# Patient Record
Sex: FEMALE | Race: BLACK OR AFRICAN AMERICAN | URBAN - METROPOLITAN AREA
[De-identification: names, ages, dates, MRNs, and addresses within clinical notes are randomized per-mention and may not be internally consistent; named-entity substitution may affect disease eponyms.]

---

## 2024-02-27 ENCOUNTER — APPOINTMENT (RX ONLY)
Dept: URBAN - METROPOLITAN AREA CLINIC 33 | Facility: CLINIC | Age: 44
Setting detail: DERMATOLOGY
End: 2024-02-27

## 2024-02-27 DIAGNOSIS — L21.8 OTHER SEBORRHEIC DERMATITIS: ICD-10-CM

## 2024-02-27 DIAGNOSIS — L29.89 OTHER PRURITUS: ICD-10-CM

## 2024-02-27 DIAGNOSIS — L29.8 OTHER PRURITUS: ICD-10-CM

## 2024-02-27 PROCEDURE — ? PRESCRIPTION MEDICATION MANAGEMENT

## 2024-02-27 PROCEDURE — ? PRESCRIPTION

## 2024-02-27 PROCEDURE — ? COUNSELING

## 2024-02-27 PROCEDURE — 99203 OFFICE O/P NEW LOW 30 MIN: CPT

## 2024-02-27 RX ORDER — HYDROCORTISONE 25 MG/G
OINTMENT TOPICAL
Qty: 28.35 | Refills: 1 | Status: ERX | COMMUNITY
Start: 2024-02-27

## 2024-02-27 RX ORDER — CLOBETASOL PROPIONATE 0.5 MG/G
OINTMENT TOPICAL
Qty: 60 | Refills: 2 | Status: ERX | COMMUNITY
Start: 2024-02-27

## 2024-02-27 RX ORDER — KETOCONAZOLE 20 MG/ML
SHAMPOO, SUSPENSION TOPICAL AS DIRECTED
Qty: 120 | Refills: 5 | Status: ERX | COMMUNITY
Start: 2024-02-27

## 2024-02-27 RX ADMIN — CLOBETASOL PROPIONATE: 0.5 OINTMENT TOPICAL at 00:00

## 2024-02-27 RX ADMIN — HYDROCORTISONE: 25 OINTMENT TOPICAL at 00:00

## 2024-02-27 RX ADMIN — KETOCONAZOLE: 20 SHAMPOO, SUSPENSION TOPICAL at 00:00

## 2024-02-27 ASSESSMENT — LOCATION SIMPLE DESCRIPTION DERM
LOCATION SIMPLE: LEFT SCALP
LOCATION SIMPLE: VULVA

## 2024-02-27 ASSESSMENT — LOCATION ZONE DERM
LOCATION ZONE: VULVA
LOCATION ZONE: SCALP

## 2024-02-27 ASSESSMENT — LOCATION DETAILED DESCRIPTION DERM
LOCATION DETAILED: RIGHT LABIA MINORA
LOCATION DETAILED: LEFT MEDIAL FRONTAL SCALP

## 2024-02-27 NOTE — HPI: RASH
What Type Of Note Output Would You Prefer (Optional)?: Bullet Format
How Severe Is Your Rash?: mild
Is This A New Presentation, Or A Follow-Up?: Rash
Additional History: Pt is here concerning itchy rash on scalp that has been going for years and rash in groin area that has been going on for 8 months. Pt reports sores in scalp. Scalp itch is 10/10. Groin is itchy 8/10. Pt has tried OTC hair oils which has not helped rash in scalp. Pt has also tried Vagasil for rash in groin which was not helpful.

## 2024-02-27 NOTE — PROCEDURE: PRESCRIPTION MEDICATION MANAGEMENT
Initiate Treatment: clobetasol 0.05 % topical ointment 1-2 x daily\\n: Apply thin layer to affected areas on scalp 1-2 x daily as needed.\\n\\nketoconazole 2 % shampoo As directed\\nApply a small amount and lather to damp scalp ,let sit for 3 to 5 minutes then rinse. Can repeat 2-3 x weekly.
Plan: Unable to examine scalp due to pt hair being styled in jason [employer]s.\\n\\nRTC 3 mo
Render In Strict Bullet Format?: No
Detail Level: Zone
Initiate Treatment: hydrocortisone 2.5 % topical ointment \\nApply a thin layer to affected area in the groin twice daily 2 weeks/month, stop for 1 week and resume as needed
Plan: Possible sub-clinical inflammation at site. We will treat with topical steroid.\\n\\nAdvised pt to use Dove sensitive skin bar soap.\\n\\nRTC 3 mo to f/u

## 2024-05-28 ENCOUNTER — APPOINTMENT (RX ONLY)
Dept: URBAN - METROPOLITAN AREA CLINIC 33 | Facility: CLINIC | Age: 44
Setting detail: DERMATOLOGY
End: 2024-05-28

## 2024-05-28 ENCOUNTER — RX ONLY (OUTPATIENT)
Age: 44
Setting detail: RX ONLY
End: 2024-05-28

## 2024-05-28 DIAGNOSIS — L29.8 OTHER PRURITUS: ICD-10-CM

## 2024-05-28 DIAGNOSIS — L29.89 OTHER PRURITUS: ICD-10-CM

## 2024-05-28 DIAGNOSIS — L21.8 OTHER SEBORRHEIC DERMATITIS: ICD-10-CM

## 2024-05-28 PROCEDURE — ? SEPARATE AND IDENTIFIABLE DOCUMENTATION

## 2024-05-28 PROCEDURE — ? COUNSELING

## 2024-05-28 PROCEDURE — ? PRESCRIPTION MEDICATION MANAGEMENT

## 2024-05-28 PROCEDURE — ? PRESCRIPTION

## 2024-05-28 PROCEDURE — 99213 OFFICE O/P EST LOW 20 MIN: CPT

## 2024-05-28 RX ORDER — KETOCONAZOLE 20 MG/G
CREAM TOPICAL QD
Qty: 60 | Refills: 2 | Status: ERX | COMMUNITY
Start: 2024-05-28

## 2024-05-28 RX ORDER — CLOBETASOL PROPIONATE 0.5 MG/ML
SOLUTION TOPICAL BID
Qty: 50 | Refills: 2 | Status: ERX | COMMUNITY
Start: 2024-05-28

## 2024-05-28 RX ORDER — KETOCONAZOLE 20 MG/ML
SHAMPOO, SUSPENSION TOPICAL AS DIRECTED
Qty: 120 | Refills: 5 | Status: ERX

## 2024-05-28 RX ORDER — GABAPENTIN 300 MG/1
CAPSULE ORAL
Qty: 30 | Refills: 2 | Status: ERX | COMMUNITY
Start: 2024-05-28

## 2024-05-28 RX ADMIN — KETOCONAZOLE: 20 CREAM TOPICAL at 00:00

## 2024-05-28 RX ADMIN — GABAPENTIN: 300 CAPSULE ORAL at 00:00

## 2024-05-28 RX ADMIN — CLOBETASOL PROPIONATE: 0.5 SOLUTION TOPICAL at 00:00

## 2024-05-28 ASSESSMENT — LOCATION SIMPLE DESCRIPTION DERM
LOCATION SIMPLE: VULVA
LOCATION SIMPLE: LEFT SCALP

## 2024-05-28 ASSESSMENT — LOCATION DETAILED DESCRIPTION DERM
LOCATION DETAILED: RIGHT LABIA MINORA
LOCATION DETAILED: LEFT MEDIAL FRONTAL SCALP

## 2024-05-28 ASSESSMENT — LOCATION ZONE DERM
LOCATION ZONE: VULVA
LOCATION ZONE: SCALP

## 2024-05-28 NOTE — PROCEDURE: PRESCRIPTION MEDICATION MANAGEMENT
Discontinue Regimen: clobetasol ointment
Initiate Treatment: ketoconazole 2 % topical cream\\n Apply a pea sized amount to affected area on scalp once daily\\n\\nclobetasol 0.05 % scalp solution\\nApply a small amount to the affected areas on the scalp twice daily as needed.
Plan: Patient has been washing hair 1-2 times weekly, and has been underusing the medications. Patient has been using both RX 1-2 months. Patient will have vehicle changed from ointment to Solution. Patient was given new regimen. \\n\\nRTC 3 mo
Render In Strict Bullet Format?: No
Continue Regimen: ketoconazole 2 % shampoo As directed\\nApply a small amount and lather to damp scalp ,let sit for 3 to 5 minutes then rinse. Can repeat 2-3 x weekly.
Detail Level: Zone
Initiate Treatment: gabapentin 300mg once daily
Plan: Patient has noticed that both that both the vulva and vaginal introitus are itchy. Patient notes that she has been using the shower head for cleaning the area, and wants to scratch the area with a towel. Suspect dysesthesia given no report of discharge and no improvement with topical steroid. Will start oral gabapentin.\\n\\nRTC 3 mo to f/u
Continue Regimen: hydrocortisone 2.5 % topical ointment \\nApply a thin layer to affected area in the groin twice daily 2 weeks/month, stop for 1 week and resume as needed PRN